# Patient Record
Sex: MALE | Race: WHITE | NOT HISPANIC OR LATINO | URBAN - METROPOLITAN AREA
[De-identification: names, ages, dates, MRNs, and addresses within clinical notes are randomized per-mention and may not be internally consistent; named-entity substitution may affect disease eponyms.]

---

## 2017-02-11 ENCOUNTER — EMERGENCY (EMERGENCY)
Facility: HOSPITAL | Age: 62
LOS: 0 days | Discharge: HOME | End: 2017-02-12
Admitting: FAMILY MEDICINE

## 2017-06-27 DIAGNOSIS — E11.9 TYPE 2 DIABETES MELLITUS WITHOUT COMPLICATIONS: ICD-10-CM

## 2017-06-27 DIAGNOSIS — M79.651 PAIN IN RIGHT THIGH: ICD-10-CM
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
## 2017-06-27 DIAGNOSIS — E78.00 PURE HYPERCHOLESTEROLEMIA, UNSPECIFIED: ICD-10-CM
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
## 2017-06-27 DIAGNOSIS — I10 ESSENTIAL (PRIMARY) HYPERTENSION: ICD-10-CM

## 2017-09-06 ENCOUNTER — EMERGENCY (EMERGENCY)
Facility: HOSPITAL | Age: 62
LOS: 0 days | Discharge: HOME | End: 2017-09-06
Admitting: FAMILY MEDICINE

## 2017-09-06 DIAGNOSIS — H61.21 IMPACTED CERUMEN, RIGHT EAR: ICD-10-CM

## 2017-09-06 DIAGNOSIS — I10 ESSENTIAL (PRIMARY) HYPERTENSION: ICD-10-CM

## 2017-09-06 DIAGNOSIS — Z79.84 LONG TERM (CURRENT) USE OF ORAL HYPOGLYCEMIC DRUGS: ICD-10-CM

## 2017-09-06 DIAGNOSIS — R20.2 PARESTHESIA OF SKIN: ICD-10-CM

## 2017-09-06 DIAGNOSIS — K56.609 UNSPECIFIED INTESTINAL OBSTRUCTION, UNSPECIFIED AS TO PARTIAL VERSUS COMPLETE OBSTRUCTION: ICD-10-CM

## 2017-09-06 DIAGNOSIS — Z79.899 OTHER LONG TERM (CURRENT) DRUG THERAPY: ICD-10-CM

## 2017-09-06 DIAGNOSIS — E11.9 TYPE 2 DIABETES MELLITUS WITHOUT COMPLICATIONS: ICD-10-CM

## 2017-09-06 DIAGNOSIS — R42 DIZZINESS AND GIDDINESS: ICD-10-CM

## 2017-09-06 DIAGNOSIS — R07.89 OTHER CHEST PAIN: ICD-10-CM

## 2017-09-06 DIAGNOSIS — E78.00 PURE HYPERCHOLESTEROLEMIA, UNSPECIFIED: ICD-10-CM

## 2017-09-06 DIAGNOSIS — R05 COUGH: ICD-10-CM

## 2018-01-31 ENCOUNTER — EMERGENCY (EMERGENCY)
Facility: HOSPITAL | Age: 63
LOS: 0 days | Discharge: HOME | End: 2018-01-31
Admitting: FAMILY MEDICINE

## 2018-01-31 DIAGNOSIS — Z79.899 OTHER LONG TERM (CURRENT) DRUG THERAPY: ICD-10-CM

## 2018-01-31 DIAGNOSIS — M25.511 PAIN IN RIGHT SHOULDER: ICD-10-CM

## 2018-01-31 DIAGNOSIS — M43.6 TORTICOLLIS: ICD-10-CM
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
## 2018-01-31 DIAGNOSIS — K56.609 UNSPECIFIED INTESTINAL OBSTRUCTION, UNSPECIFIED AS TO PARTIAL VERSUS COMPLETE OBSTRUCTION: ICD-10-CM
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
## 2018-01-31 DIAGNOSIS — M54.2 CERVICALGIA: ICD-10-CM

## 2018-01-31 DIAGNOSIS — M62.838 OTHER MUSCLE SPASM: ICD-10-CM
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
## 2018-01-31 DIAGNOSIS — E78.00 PURE HYPERCHOLESTEROLEMIA, UNSPECIFIED: ICD-10-CM

## 2018-01-31 DIAGNOSIS — I10 ESSENTIAL (PRIMARY) HYPERTENSION: ICD-10-CM

## 2018-05-17 ENCOUNTER — EMERGENCY (EMERGENCY)
Facility: HOSPITAL | Age: 63
LOS: 0 days | Discharge: HOME | End: 2018-05-17
Admitting: PHYSICIAN ASSISTANT

## 2018-05-17 VITALS
TEMPERATURE: 98 F | RESPIRATION RATE: 20 BRPM | SYSTOLIC BLOOD PRESSURE: 164 MMHG | HEART RATE: 78 BPM | OXYGEN SATURATION: 97 % | DIASTOLIC BLOOD PRESSURE: 96 MMHG
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
## 2018-05-17 DIAGNOSIS — E11.9 TYPE 2 DIABETES MELLITUS WITHOUT COMPLICATIONS: ICD-10-CM

## 2018-05-17 DIAGNOSIS — E78.00 PURE HYPERCHOLESTEROLEMIA, UNSPECIFIED: ICD-10-CM

## 2018-05-17 DIAGNOSIS — Z79.899 OTHER LONG TERM (CURRENT) DRUG THERAPY: ICD-10-CM

## 2018-05-17 DIAGNOSIS — M25.561 PAIN IN RIGHT KNEE: ICD-10-CM

## 2018-05-17 DIAGNOSIS — Z79.84 LONG TERM (CURRENT) USE OF ORAL HYPOGLYCEMIC DRUGS: ICD-10-CM
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
## 2018-05-17 DIAGNOSIS — I10 ESSENTIAL (PRIMARY) HYPERTENSION: ICD-10-CM

## 2018-05-17 DIAGNOSIS — Z98.890 OTHER SPECIFIED POSTPROCEDURAL STATES: Chronic | ICD-10-CM

## 2018-05-17 DIAGNOSIS — S83.91XA SPRAIN OF UNSPECIFIED SITE OF RIGHT KNEE, INITIAL ENCOUNTER: ICD-10-CM
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
## 2018-05-17 RX ORDER — AMLODIPINE BESYLATE 2.5 MG/1
0 TABLET ORAL
Qty: 0 | Refills: 0 | COMMUNITY

## 2018-05-17 RX ORDER — METFORMIN HYDROCHLORIDE 850 MG/1
1 TABLET ORAL
Qty: 0 | Refills: 0 | COMMUNITY

## 2018-05-17 RX ORDER — ATORVASTATIN CALCIUM 80 MG/1
0 TABLET, FILM COATED ORAL
Qty: 0 | Refills: 0 | COMMUNITY

## 2018-05-17 RX ORDER — BENAZEPRIL HYDROCHLORIDE 40 MG/1
0 TABLET ORAL
Qty: 0 | Refills: 0 | COMMUNITY

## 2018-05-17 NOTE — ED PROVIDER NOTE - OBJECTIVE STATEMENT
62 y.o. male with a PMHx of HTN, DM, and hyperlipidemia presented to the ER c/o Right knee pain after "twisting" extremity getting into car yesterday.  FROM.  No weakness.  No paresthesias.  No other complaints.

## 2018-05-17 NOTE — ED PROVIDER NOTE - MEDICAL DECISION MAKING DETAILS
RICE; OTC ibuprofen as needed as directed; orthopedic referral; knee immobilizer given with instructions; any new or worsening symptoms, pt will return to ER   Note complete

## 2018-05-17 NOTE — ED PROVIDER NOTE - MUSCULOSKELETAL, MLM
Right Knee: (+) swelling superior medially with tenderness; FROM with pain; no laxity, no motor or sensory deficit, pedal pulses 2+

## 2018-10-24 ENCOUNTER — OUTPATIENT (OUTPATIENT)
Dept: OUTPATIENT SERVICES | Facility: HOSPITAL | Age: 63
LOS: 1 days | Discharge: HOME | End: 2018-10-24

## 2018-10-24 DIAGNOSIS — Z98.890 OTHER SPECIFIED POSTPROCEDURAL STATES: Chronic | ICD-10-CM

## 2018-10-24 PROBLEM — E11.9 TYPE 2 DIABETES MELLITUS WITHOUT COMPLICATIONS: Chronic | Status: ACTIVE | Noted: 2018-05-17

## 2018-10-24 PROBLEM — I10 ESSENTIAL (PRIMARY) HYPERTENSION: Chronic | Status: ACTIVE | Noted: 2018-05-17

## 2018-10-24 PROBLEM — E78.00 PURE HYPERCHOLESTEROLEMIA, UNSPECIFIED: Chronic | Status: ACTIVE | Noted: 2018-05-17

## 2021-03-15 ENCOUNTER — OUTPATIENT (OUTPATIENT)
Dept: OUTPATIENT SERVICES | Facility: HOSPITAL | Age: 66
LOS: 1 days | Discharge: HOME | End: 2021-03-15
Payer: MEDICARE

## 2021-03-15 DIAGNOSIS — Z98.890 OTHER SPECIFIED POSTPROCEDURAL STATES: Chronic | ICD-10-CM

## 2021-03-15 DIAGNOSIS — R07.89 OTHER CHEST PAIN: ICD-10-CM

## 2021-03-15 PROCEDURE — 78452 HT MUSCLE IMAGE SPECT MULT: CPT | Mod: 26

## 2021-03-24 ENCOUNTER — TRANSCRIPTION ENCOUNTER (OUTPATIENT)
Age: 66
End: 2021-03-24

## 2021-08-06 ENCOUNTER — TRANSCRIPTION ENCOUNTER (OUTPATIENT)
Age: 66
End: 2021-08-06

## 2022-01-07 ENCOUNTER — TRANSCRIPTION ENCOUNTER (OUTPATIENT)
Age: 67
End: 2022-01-07

## 2022-03-09 PROBLEM — Z00.00 ENCOUNTER FOR PREVENTIVE HEALTH EXAMINATION: Status: ACTIVE | Noted: 2022-03-09

## 2023-06-22 ENCOUNTER — APPOINTMENT (OUTPATIENT)
Dept: UROLOGY | Facility: CLINIC | Age: 68
End: 2023-06-22
Payer: MEDICARE

## 2023-06-22 VITALS
WEIGHT: 195 LBS | HEIGHT: 65 IN | SYSTOLIC BLOOD PRESSURE: 189 MMHG | BODY MASS INDEX: 32.49 KG/M2 | HEART RATE: 71 BPM | TEMPERATURE: 97.3 F | DIASTOLIC BLOOD PRESSURE: 97 MMHG

## 2023-06-22 DIAGNOSIS — N28.9 DISORDER OF KIDNEY AND URETER, UNSPECIFIED: ICD-10-CM

## 2023-06-22 DIAGNOSIS — E11.9 TYPE 2 DIABETES MELLITUS W/OUT COMPLICATIONS: ICD-10-CM

## 2023-06-22 DIAGNOSIS — Z87.442 PERSONAL HISTORY OF URINARY CALCULI: ICD-10-CM

## 2023-06-22 DIAGNOSIS — N26.1 ATROPHY OF KIDNEY (TERMINAL): ICD-10-CM

## 2023-06-22 DIAGNOSIS — N20.0 CALCULUS OF KIDNEY: ICD-10-CM

## 2023-06-22 DIAGNOSIS — R97.20 ELEVATED PROSTATE, SPECIFIC ANTIGEN [PSA]: ICD-10-CM

## 2023-06-22 DIAGNOSIS — D35.00 BENIGN NEOPLASM OF UNSPECIFIED ADRENAL GLAND: ICD-10-CM

## 2023-06-22 PROCEDURE — 99205 OFFICE O/P NEW HI 60 MIN: CPT

## 2023-06-22 NOTE — HISTORY OF PRESENT ILLNESS
[FreeTextEntry1] : SOM PINEDA is a 67 year old male hx of solitary left kidney with nephrolithiasis (right nephrectomy ~ 1990 due to atrophic kidney/nephrolithiasis per pt at [facility]) who presents for evaluation for right renal fossa lesion and right adrenal mass.\par \par Pt had a nephrectomy approximately 35 years ago due to kidney stones "dead kidney" in [facility]. He has not had issues with obstructive kidney stones since \par His urinary symptoms are largely non bothersome with nocturia 1-2x \par He is taking flomax as prescribed by Dr. Jeong. \par \par MRI adrenal 05/2023 images visualized - 5.3 cm right adrenal mass , consistent with adenoma. The pt has stated right nephrectomy , but there is a small lentiform mildly enhancing soft tissue structure ~2cm in the right renal fossa . there is suggestion of presences of right renal artery and a renal vein. Mild hepatic steatosis . Left kidney is enlarged with renal cysts. Punctate pancreatic cysts , 3 mm of doubtful significance. significant prostate enlargement\par \par  MRI pelvis 05/2023 images - prostatic hypertrophy with thickened urinary bladder . A direct right fat containing inguinal hernia . The right perivesical fat slightly pulled into the neck of the hernia.\par \par CT non con 06/2022 - 1.1 cm calculus in the LP of his solitary left kidney . No hydro or ureteral calculus. There is moderate perinephric stranding adjacent to the lower left kidney could represent a recently passed calculus \par \par  PMH including previous kidney stones, s/p right nephrectomy ~ 1990 .likely had prior prostate biopsy neg per pt  \par Family History: No  malignancies\par Social History: former  , former smoker \par \par Old records reviewed:\par as per Dr Hernandez he had a prior adrenal biochemical w/u that was unremarkable \par \par Past results \par PSA 2/21 - 8.4 % free 17 \par PSA 3/21 - 7.69 % free 23 // 4K 23 \par PSA 11/21 - 8.7 \par PSA 06/22 - 7.63 % free 34 // 4K 9 \par \par PSA 05/2023 - 7.3 % free 41 \par CT 11/2016 images visualized - showing 9 mm LP stone in solitary left kidney \par Right Adrenal lesion measures 5.3 cm on coronal

## 2023-06-22 NOTE — PHYSICAL EXAM

## 2023-06-22 NOTE — ASSESSMENT
[FreeTextEntry1] : SOM PINEDA is a 67 year old male hx of solitary left kidney with nephrolithiasis (right nephrectomy ~ 1990 due to atrophic kidney/nephrolithiasis per pt at [facility]) who presents for evaluation for right renal fossa lesion and right adrenal mass.\par Patient's right adrenal lesion is an adenoma and unchanged from 2016.\par \par Discussed the findings of the adrenal adenoma.\par At this point I did not recommend adrenalectomy as the adrenal adenoma has not changed in size and typically an adrenal cortical carcinoma would have grown by now.\par We discussed the risk and benefits of adrenalectomy, and explained to the patient that given his prior open nephrectomy surgery that adrenalectomy would have higher risk.\par I recommended repeating biochemical work-up and surveillance with repeat imaging in 6 months.  Explained the importance of follow-up as a biochemically active adrenal adenoma can lead to life-threatening condition such as stroke.  \par \par Additionally we discussed the approximate 2 cm right lentiform structure at the right renal fossa.  This has the appearance of postsurgical change/residual kidney parenchyma from prior nephrectomy, as there is also renal artery and vein feeding this tissue.  This does not have the appearance of neoplasm.\par We will follow up with imaging in 6 months\par \par Findings discussed w Dr Hernandez. He will continue to follow patient for his other general urologic issues, prostate, elevated PSA, kidney stones.\par I did explain to the patient that I have concerns regarding the growth of the left lower pole stone and that he may want to consider intervention, as there is always risk of obstruction/stone growth.  Even if the stone does not obstruct and continues to grow, a PCNL will be much riskier in a solitary kidney than URS/eswl. im favoring eswl if tx wishes to have tx.\par \par Plan \par 6 months f/u with CT ap w/wo cont + KUB  to reassess adrenal gland/ right renal fossa lesion + nephrolithiasis  \par adrenal biochemical labs to be done

## 2023-06-22 NOTE — ADDENDUM
[FreeTextEntry1] : Patient's note was transcribed with the assistance of a medical scribe under the supervision of Dr. Coyle.\par I, Dr. Coyle, have reviewed the patient's chart and agree that it aligns with my medical decisions.\par Yaneli Reese, our scribe, also served as a chaperone for physical examination purposes.\par \par \par

## 2023-06-23 ENCOUNTER — APPOINTMENT (OUTPATIENT)
Dept: CARDIOLOGY | Facility: CLINIC | Age: 68
End: 2023-06-23
Payer: MEDICARE

## 2023-06-23 VITALS
HEART RATE: 68 BPM | SYSTOLIC BLOOD PRESSURE: 154 MMHG | BODY MASS INDEX: 32.32 KG/M2 | WEIGHT: 194 LBS | DIASTOLIC BLOOD PRESSURE: 90 MMHG | TEMPERATURE: 97.6 F | HEIGHT: 65 IN

## 2023-06-23 DIAGNOSIS — E78.00 PURE HYPERCHOLESTEROLEMIA, UNSPECIFIED: ICD-10-CM

## 2023-06-23 DIAGNOSIS — I10 ESSENTIAL (PRIMARY) HYPERTENSION: ICD-10-CM

## 2023-06-23 DIAGNOSIS — R51.9 HEADACHE, UNSPECIFIED: ICD-10-CM

## 2023-06-23 DIAGNOSIS — Z78.9 OTHER SPECIFIED HEALTH STATUS: ICD-10-CM

## 2023-06-23 PROCEDURE — 99204 OFFICE O/P NEW MOD 45 MIN: CPT | Mod: 25

## 2023-06-23 PROCEDURE — 93000 ELECTROCARDIOGRAM COMPLETE: CPT

## 2023-06-23 RX ORDER — FUROSEMIDE 20 MG/1
20 TABLET ORAL DAILY
Refills: 0 | Status: DISCONTINUED | COMMUNITY
End: 2023-06-23

## 2023-06-23 RX ORDER — CHLORTHALIDONE 25 MG/1
25 TABLET ORAL DAILY
Qty: 90 | Refills: 3 | Status: ACTIVE | COMMUNITY
Start: 2023-06-23 | End: 1900-01-01

## 2023-06-23 RX ORDER — AMLODIPINE BESYLATE 10 MG/1
10 TABLET ORAL
Refills: 0 | Status: DISCONTINUED | COMMUNITY
End: 2023-06-23

## 2023-06-23 RX ORDER — DAPAGLIFLOZIN 10 MG/1
10 TABLET, FILM COATED ORAL DAILY
Refills: 0 | Status: ACTIVE | COMMUNITY

## 2023-06-23 RX ORDER — ATORVASTATIN CALCIUM 40 MG/1
40 TABLET, FILM COATED ORAL DAILY
Refills: 0 | Status: ACTIVE | COMMUNITY

## 2023-06-23 RX ORDER — METFORMIN HYDROCHLORIDE 1000 MG/1
1000 TABLET, COATED ORAL
Refills: 0 | Status: ACTIVE | COMMUNITY

## 2023-06-23 RX ORDER — DUTASTERIDE 0.5 MG/1
0.5 CAPSULE, LIQUID FILLED ORAL DAILY
Refills: 0 | Status: ACTIVE | COMMUNITY

## 2023-06-23 RX ORDER — AMLODIPINE BESYLATE AND BENAZEPRIL HYDROCHLORIDE 10; 40 MG/1; MG/1
10-40 CAPSULE ORAL DAILY
Refills: 0 | Status: ACTIVE | COMMUNITY

## 2023-06-24 PROBLEM — Z78.9 NON-SMOKER: Status: ACTIVE | Noted: 2023-06-23

## 2023-06-24 PROBLEM — I10 HYPERTENSION: Status: ACTIVE | Noted: 2023-06-23

## 2023-06-24 PROBLEM — E78.00 HIGH CHOLESTEROL: Status: ACTIVE | Noted: 2023-06-22

## 2023-06-24 RX ORDER — OMEGA-3/DHA/EPA/FISH OIL 35-113.5MG
1000 TABLET,CHEWABLE ORAL
Qty: 90 | Refills: 0 | Status: ACTIVE | COMMUNITY
Start: 2023-04-06

## 2023-06-24 RX ORDER — TAMSULOSIN HYDROCHLORIDE 0.4 MG/1
0.4 CAPSULE ORAL
Qty: 90 | Refills: 0 | Status: ACTIVE | COMMUNITY
Start: 2023-05-09

## 2023-06-24 RX ORDER — SITAGLIPTIN 25 MG/1
25 TABLET, FILM COATED ORAL
Qty: 90 | Refills: 0 | Status: ACTIVE | COMMUNITY
Start: 2023-06-19

## 2023-06-24 RX ORDER — AMOXICILLIN AND CLAVULANATE POTASSIUM 875; 125 MG/1; MG/1
875-125 TABLET, COATED ORAL
Qty: 20 | Refills: 0 | Status: ACTIVE | COMMUNITY
Start: 2023-04-03

## 2023-06-24 RX ORDER — TOBRAMYCIN 3 MG/ML
0.3 SOLUTION/ DROPS OPHTHALMIC
Qty: 5 | Refills: 0 | Status: ACTIVE | COMMUNITY
Start: 2023-04-03

## 2023-06-24 NOTE — HISTORY OF PRESENT ILLNESS
[FreeTextEntry1] : 67 year old male is referred for hypertension. The patient reported constantly elevated SBP ~ 150 associated with headache. The patient reported double vision for more than 4 weeks and was evaluated by opthalmology and referred for carotid Doppler. The patient had facial nerve palsy and was teated more than 6 months ago in [place]. Activity is limited and does not exercise regularly. Has +1 BL Le edema. \par

## 2023-06-24 NOTE — ASSESSMENT
[FreeTextEntry1] : 67 year old male is referred for hypertension. The patient reported constantly elevated SBP ~ 150 associated with headache. The patient reported double vision for more than 4 weeks and was evaluated by opthalmology and referred for carotid Doppler. The patient had facial nerve palsy and was teated more than 6 months ago in [place]. Activity is limited and does not exercise regularly. Has +1 BL Le edema. \par \par \par plan :\par CTH \par Neurology referral \par Labs \par Stress echo

## 2023-06-24 NOTE — REVIEW OF SYSTEMS
[Headache] : headache [Negative] : Musculoskeletal [FreeTextEntry3] : see HPi  [FreeTextEntry5] : see HPi  [FreeTextEntry8] : see HPi  [de-identified] : see HPI

## 2023-06-24 NOTE — PHYSICAL EXAM
[Normal Venous Pressure] : normal venous pressure [No Carotid Bruit] : no carotid bruit [Normal] : normal gait [Moves all extremities] : moves all extremities [No Focal Deficits] : no focal deficits [de-identified] : + 1 BL edema

## 2023-06-28 ENCOUNTER — NON-APPOINTMENT (OUTPATIENT)
Age: 68
End: 2023-06-28

## 2023-07-07 ENCOUNTER — RESULT REVIEW (OUTPATIENT)
Age: 68
End: 2023-07-07

## 2023-07-07 ENCOUNTER — OUTPATIENT (OUTPATIENT)
Dept: OUTPATIENT SERVICES | Facility: HOSPITAL | Age: 68
LOS: 1 days | End: 2023-07-07
Payer: MEDICARE

## 2023-07-07 DIAGNOSIS — Z98.890 OTHER SPECIFIED POSTPROCEDURAL STATES: Chronic | ICD-10-CM

## 2023-07-07 DIAGNOSIS — Z00.8 ENCOUNTER FOR OTHER GENERAL EXAMINATION: ICD-10-CM

## 2023-07-07 DIAGNOSIS — R51.9 HEADACHE, UNSPECIFIED: ICD-10-CM

## 2023-07-07 PROCEDURE — 70450 CT HEAD/BRAIN W/O DYE: CPT

## 2023-07-07 PROCEDURE — 70450 CT HEAD/BRAIN W/O DYE: CPT | Mod: 26

## 2023-07-08 DIAGNOSIS — R51.9 HEADACHE, UNSPECIFIED: ICD-10-CM

## 2023-07-21 ENCOUNTER — NON-APPOINTMENT (OUTPATIENT)
Age: 68
End: 2023-07-21

## 2023-07-26 ENCOUNTER — APPOINTMENT (OUTPATIENT)
Dept: CARDIOLOGY | Facility: CLINIC | Age: 68
End: 2023-07-26

## 2023-07-28 ENCOUNTER — APPOINTMENT (OUTPATIENT)
Dept: CARDIOLOGY | Facility: CLINIC | Age: 68
End: 2023-07-28

## 2023-12-21 ENCOUNTER — APPOINTMENT (OUTPATIENT)
Dept: UROLOGY | Facility: CLINIC | Age: 68
End: 2023-12-21

## 2024-02-15 ENCOUNTER — APPOINTMENT (OUTPATIENT)
Dept: UROLOGY | Facility: CLINIC | Age: 69
End: 2024-02-15
